# Patient Record
(demographics unavailable — no encounter records)

---

## 2024-10-31 NOTE — ASSESSMENT
[FreeTextEntry1] : 70 year M with bilateral gastroc strain - physical therapy and NSAIDs (mobic) was prescribed  - Return in 6 weeks for follow up PRN  10/31/2024:  Continue HEP Flexeril and meloxicam prescribed for as needed basis Follow up PRN

## 2024-10-31 NOTE — IMAGING
[de-identified] : Constitutional: well developed and well nourished, able to communicate Cardiovascular: Peripheral vascular exam is grossly normal Neurologic: Alert and oriented, no acute distress. Skin: normal skin with no ulcers, rashes, or lesions Pulmonary: No respiratory distress, breathing comfortably on room air Lymphatics: No obvious lymphadenopathy or lymphedema in areas examined  b/l lower extremity TTP gastrocnemius pain NVI left calf large then right, no TTP posterior

## 2024-10-31 NOTE — HISTORY OF PRESENT ILLNESS
[de-identified] : 04/12/2024 Mr. SUPRIYA DRAPER is a 70-year male that comes in today with a chief complaint of left hip / Dmitriy calf  seen prior for gastroc strain. Mobix and flexeril does help out.  10/31/24 pt is here for follow up dmitriy calfs, still having soreness, stretching helps. Has been doing stretching at home.  [] : no

## 2025-01-02 NOTE — IMAGING
[de-identified] : Constitutional: well developed and well nourished, able to communicate Cardiovascular: Peripheral vascular exam is grossly normal Neurologic: Alert and oriented, no acute distress. Skin: normal skin with no ulcers, rashes, or lesions Pulmonary: No respiratory distress, breathing comfortably on room air Lymphatics: No obvious lymphadenopathy or lymphedema in areas examined  b/l lower extremity TTP gastrocnemius pain NVI left calf large then right, no TTP posterior asymmetric swelling left leg.

## 2025-01-02 NOTE — ASSESSMENT
[FreeTextEntry1] : 70 year M with bilateral gastroc strain - physical therapy and NSAIDs (mobic) was prescribed  - Return in 6 weeks for follow up PRN  10/31/2024:  Continue HEP Flexeril and meloxicam prescribed for as needed basis Follow up PRN  01/02/2025  PT renewed duplex of left leg to r/o DVT mobic sent to pharmacy

## 2025-01-02 NOTE — HISTORY OF PRESENT ILLNESS
[de-identified] : 04/12/2024 Mr. SUPRIYA DRAPER is a 70-year male that comes in today with a chief complaint of left hip / Dmitriy calf  seen prior for gastroc strain. Mobix and flexeril does help out.  10/31/24 pt is here for follow up dmitriy calfs, still having soreness, stretching helps. Has been doing stretching at home.   1/2/25 p is here for follow up on dmitriy calfs, states he feels the same just has pain in the morning, requesting script for Pt  mobic is helping [] : no

## 2025-01-16 NOTE — IMAGING
[de-identified] : Constitutional: well developed and well nourished, able to communicate Cardiovascular: Peripheral vascular exam is grossly normal Neurologic: Alert and oriented, no acute distress. Skin: normal skin with no ulcers, rashes, or lesions Pulmonary: No respiratory distress, breathing comfortably on room air Lymphatics: No obvious lymphadenopathy or lymphedema in areas examined  b/l lower extremity TTP gastrocnemius pain NVI left calf large then right, no TTP posterior asymmetric swelling left leg. +pitting edema LLE

## 2025-01-16 NOTE — ASSESSMENT
[FreeTextEntry1] : 70 year M with bilateral gastroc strain - physical therapy and NSAIDs (mobic) was prescribed  - Return in 6 weeks for follow up PRN  10/31/2024:  Continue HEP Flexeril and meloxicam prescribed for as needed basis Follow up PRN  01/02/2025  PT renewed duplex of left leg to r/o DVT mobic sent to pharmacy  01/16/2025  duplex is normal, recommend vascular work. Cardiolgoist no issues\

## 2025-01-16 NOTE — HISTORY OF PRESENT ILLNESS
[de-identified] : 04/12/2024 Mr. SUPRIYA DRAPER is a 70-year male that comes in today with a chief complaint of left hip / Dmitriy calf  seen prior for gastroc strain. Mobix and flexeril does help out.  10/31/24 pt is here for follow up dmitriy calfs, still having soreness, stretching helps. Has been doing stretching at home.   1/2/25 p is here for follow up on dmitriy calfs, states he feels the same just has pain in the morning, requesting script for Pt  mobic is helping  1/16/25 here for follow up on dmitriy calfs, states he feels the same, no further changes, still has not started PT, was told no blood clots seen in ultrasound [] : no

## 2025-07-08 NOTE — DISCUSSION/SUMMARY
[de-identified] : Patient allowed to gently start resuming activities. Discussed change to medication prescription and usage. Bracing options discussed with patient for stability and support. Activity modification as needed Discussed poss future surgery, pt deciding, questions answered, no guarantees R rTSA try topical lidocaine for pain control reviewed current medications used by this patient Home exercises for functional return

## 2025-07-08 NOTE — REASON FOR VISIT
Floor RN verbalized understanding to DC PICC.  
[FreeTextEntry2] : New Injury - Dmitriy Shoulder Pain, R > L

## 2025-07-08 NOTE — PROCEDURE
[FreeTextEntry3] : Large Joint Injection / Aspiration: Celestone, Lidocaine, Marcaine and Guidance Ultrasound Large Joint Injection was performed because of pain and inflammation. Anesthesia: ethyl chloride sprayed topically..  Celestone: An injection of Celestone 6 mg , 1 cc. Needle size: 22 gauge , 1.5 inch.  Lidocaine: 3 cc.  Marcaine: 3 cc.   Medication was injected in the right shoulder. Patient has tried OTC's including aspirin, Ibuprofen, Aleve etc or prescription NSAIDS, and/or exercises at home and/ or physical therapy without satisfactory response. After verbal consent using sterile preparation and technique. The risks, benefits, and alternatives to cortisone injection were explained in full to the patient. Risks outlined include but are not limited to infection, sepsis, bleeding, scarring, skin discoloration, temporary increase in pain, syncopal episode, failure to resolve symptoms, allergic reaction, symptom recurrence, and elevation of blood sugar in diabetics. Patient understood the risks. All questions were answered. After discussion of options, patient requested an injection. Oral informed consent was obtained and sterile prep was done of the injection site. Sterile technique was utilized for the procedure including the preparation of the solutions used for the injection. Patient tolerated the procedure well. Advised to ice the injection site this evening. Prep with betadine locally to site. Sterile technique used. Patient tolerated procedure well. Post Procedure Instructions: Patient was advised to call if redness, pain, or fever occur and apply ice for 15 min. out of every hour for the next 12-24 hours as tolerated. patient was advised to rest the joint(s) for 1 days.   Ultrasound Guidance was used for the following reasons: for Glenohumeral injection.   Ultrasound guided injection was performed of the shoulder, visualization of the needle and placement of injection was performed without complication.   Large Joint Injection / Aspiration: Celestone, Lidocaine, Marcaine and Guidance Ultrasound Large Joint Injection was performed because of pain and inflammation. Anesthesia: ethyl chloride sprayed topically..  Celestone: An injection of Celestone 6 mg , 1 cc. Needle size: 22 gauge , 1.5 inch.  Lidocaine: 3 cc.  Marcaine: 3 cc.   Medication was injected in the left shoulder. Patient has tried OTC's including aspirin, Ibuprofen, Aleve etc or prescription NSAIDS, and/or exercises at home and/ or physical therapy without satisfactory response. After verbal consent using sterile preparation and technique. The risks, benefits, and alternatives to cortisone injection were explained in full to the patient. Risks outlined include but are not limited to infection, sepsis, bleeding, scarring, skin discoloration, temporary increase in pain, syncopal episode, failure to resolve symptoms, allergic reaction, symptom recurrence, and elevation of blood sugar in diabetics. Patient understood the risks. All questions were answered. After discussion of options, patient requested an injection. Oral informed consent was obtained and sterile prep was done of the injection site. Sterile technique was utilized for the procedure including the preparation of the solutions used for the injection. Patient tolerated the procedure well. Advised to ice the injection site this evening. Prep with betadine locally to site. Sterile technique used. Patient tolerated procedure well. Post Procedure Instructions: Patient was advised to call if redness, pain, or fever occur and apply ice for 15 min. out of every hour for the next 12-24 hours as tolerated. patient was advised to rest the joint(s) for 1 days.   Ultrasound Guidance was used for the following reasons: for Glenohumeral injection.   Ultrasound guided injection was performed of the shoulder, visualization of the needle and placement of injection was performed without complication.

## 2025-07-08 NOTE — PHYSICAL EXAM
[5 ___] : forward flexion 5[unfilled]/5 [4___] : internal rotation 4[unfilled]/5 [] : tenderness to palpation [5___] : external rotation 5[unfilled]/5 [Bilateral] : shoulder bilaterally [Degenerative change] : Degenerative change [TWNoteComboBox7] : active forward flexion 150 degrees [de-identified] : active abduction 135 degrees [TWNoteComboBox6] : internal rotation 45 degrees [de-identified] : external rotation 45 degrees

## 2025-07-08 NOTE — HISTORY OF PRESENT ILLNESS
[Frequent] : frequent [Leisure] : leisure [Rest] : rest [Meds] : meds [Lying in bed] : lying in bed [de-identified] : Pt. is a 72 year RHD old male who presents for evaluation of his shoulders. Denies trauma. Symptoms since March/April 2025after fell in his house and reached out. Pain with reaching/overhead activity. Has tried OTC meds. No previous injury/problem with either shoulder. No numbness/tingling reported. Had issue R shoulder in 2021 and saw Dr Myers and had MRI with RCA and labral tear,  [] : no [FreeTextEntry1] : Dmitriy Shoulder  [FreeTextEntry5] : Patient has been experiencing pain in the shoulder for the past couple of months.